# Patient Record
Sex: MALE | Race: BLACK OR AFRICAN AMERICAN | NOT HISPANIC OR LATINO | URBAN - METROPOLITAN AREA
[De-identification: names, ages, dates, MRNs, and addresses within clinical notes are randomized per-mention and may not be internally consistent; named-entity substitution may affect disease eponyms.]

---

## 2023-01-15 ENCOUNTER — EMERGENCY (EMERGENCY)
Facility: HOSPITAL | Age: 43
LOS: 1 days | Discharge: HOME | End: 2023-01-15
Attending: STUDENT IN AN ORGANIZED HEALTH CARE EDUCATION/TRAINING PROGRAM | Admitting: STUDENT IN AN ORGANIZED HEALTH CARE EDUCATION/TRAINING PROGRAM
Payer: COMMERCIAL

## 2023-01-15 VITALS
OXYGEN SATURATION: 100 % | HEART RATE: 53 BPM | DIASTOLIC BLOOD PRESSURE: 81 MMHG | SYSTOLIC BLOOD PRESSURE: 126 MMHG | RESPIRATION RATE: 16 BRPM | TEMPERATURE: 97 F

## 2023-01-15 VITALS
TEMPERATURE: 98 F | RESPIRATION RATE: 18 BRPM | SYSTOLIC BLOOD PRESSURE: 133 MMHG | HEART RATE: 57 BPM | OXYGEN SATURATION: 100 % | DIASTOLIC BLOOD PRESSURE: 83 MMHG

## 2023-01-15 LAB
ALBUMIN SERPL ELPH-MCNC: 4.5 G/DL — SIGNIFICANT CHANGE UP (ref 3.3–5)
ALP SERPL-CCNC: 109 U/L — SIGNIFICANT CHANGE UP (ref 40–120)
ALT FLD-CCNC: 21 U/L — SIGNIFICANT CHANGE UP (ref 4–41)
ANION GAP SERPL CALC-SCNC: 9 MMOL/L — SIGNIFICANT CHANGE UP (ref 7–14)
AST SERPL-CCNC: 29 U/L — SIGNIFICANT CHANGE UP (ref 4–40)
BASOPHILS # BLD AUTO: 0.01 K/UL — SIGNIFICANT CHANGE UP (ref 0–0.2)
BASOPHILS NFR BLD AUTO: 0.2 % — SIGNIFICANT CHANGE UP (ref 0–2)
BILIRUB SERPL-MCNC: 0.6 MG/DL — SIGNIFICANT CHANGE UP (ref 0.2–1.2)
BUN SERPL-MCNC: 16 MG/DL — SIGNIFICANT CHANGE UP (ref 7–23)
CALCIUM SERPL-MCNC: 9.8 MG/DL — SIGNIFICANT CHANGE UP (ref 8.4–10.5)
CHLORIDE SERPL-SCNC: 101 MMOL/L — SIGNIFICANT CHANGE UP (ref 98–107)
CO2 SERPL-SCNC: 27 MMOL/L — SIGNIFICANT CHANGE UP (ref 22–31)
CREAT SERPL-MCNC: 0.92 MG/DL — SIGNIFICANT CHANGE UP (ref 0.5–1.3)
EGFR: 105 ML/MIN/1.73M2 — SIGNIFICANT CHANGE UP
EOSINOPHIL # BLD AUTO: 0.05 K/UL — SIGNIFICANT CHANGE UP (ref 0–0.5)
EOSINOPHIL NFR BLD AUTO: 0.9 % — SIGNIFICANT CHANGE UP (ref 0–6)
GLUCOSE SERPL-MCNC: 100 MG/DL — HIGH (ref 70–99)
HCT VFR BLD CALC: 42 % — SIGNIFICANT CHANGE UP (ref 39–50)
HGB BLD-MCNC: 14.2 G/DL — SIGNIFICANT CHANGE UP (ref 13–17)
IANC: 2.26 K/UL — SIGNIFICANT CHANGE UP (ref 1.8–7.4)
IMM GRANULOCYTES NFR BLD AUTO: 0.4 % — SIGNIFICANT CHANGE UP (ref 0–0.9)
LYMPHOCYTES # BLD AUTO: 2.54 K/UL — SIGNIFICANT CHANGE UP (ref 1–3.3)
LYMPHOCYTES # BLD AUTO: 47.6 % — HIGH (ref 13–44)
MCHC RBC-ENTMCNC: 30.7 PG — SIGNIFICANT CHANGE UP (ref 27–34)
MCHC RBC-ENTMCNC: 33.8 GM/DL — SIGNIFICANT CHANGE UP (ref 32–36)
MCV RBC AUTO: 90.7 FL — SIGNIFICANT CHANGE UP (ref 80–100)
MONOCYTES # BLD AUTO: 0.46 K/UL — SIGNIFICANT CHANGE UP (ref 0–0.9)
MONOCYTES NFR BLD AUTO: 8.6 % — SIGNIFICANT CHANGE UP (ref 2–14)
NEUTROPHILS # BLD AUTO: 2.26 K/UL — SIGNIFICANT CHANGE UP (ref 1.8–7.4)
NEUTROPHILS NFR BLD AUTO: 42.3 % — LOW (ref 43–77)
NRBC # BLD: 0 /100 WBCS — SIGNIFICANT CHANGE UP (ref 0–0)
NRBC # FLD: 0 K/UL — SIGNIFICANT CHANGE UP (ref 0–0)
PLATELET # BLD AUTO: 284 K/UL — SIGNIFICANT CHANGE UP (ref 150–400)
POTASSIUM SERPL-MCNC: 4.5 MMOL/L — SIGNIFICANT CHANGE UP (ref 3.5–5.3)
POTASSIUM SERPL-SCNC: 4.5 MMOL/L — SIGNIFICANT CHANGE UP (ref 3.5–5.3)
PROT SERPL-MCNC: 8.3 G/DL — SIGNIFICANT CHANGE UP (ref 6–8.3)
RBC # BLD: 4.63 M/UL — SIGNIFICANT CHANGE UP (ref 4.2–5.8)
RBC # FLD: 11.9 % — SIGNIFICANT CHANGE UP (ref 10.3–14.5)
SARS-COV-2 RNA SPEC QL NAA+PROBE: SIGNIFICANT CHANGE UP
SODIUM SERPL-SCNC: 137 MMOL/L — SIGNIFICANT CHANGE UP (ref 135–145)
WBC # BLD: 5.34 K/UL — SIGNIFICANT CHANGE UP (ref 3.8–10.5)
WBC # FLD AUTO: 5.34 K/UL — SIGNIFICANT CHANGE UP (ref 3.8–10.5)

## 2023-01-15 PROCEDURE — 99284 EMERGENCY DEPT VISIT MOD MDM: CPT

## 2023-01-15 PROCEDURE — 70491 CT SOFT TISSUE NECK W/DYE: CPT | Mod: 26,MA

## 2023-01-15 RX ORDER — CEPHALEXIN 500 MG
1 CAPSULE ORAL
Qty: 10 | Refills: 0
Start: 2023-01-15 | End: 2023-01-19

## 2023-01-15 RX ORDER — CEPHALEXIN 500 MG
500 CAPSULE ORAL ONCE
Refills: 0 | Status: COMPLETED | OUTPATIENT
Start: 2023-01-15 | End: 2023-01-15

## 2023-01-15 RX ORDER — KETOROLAC TROMETHAMINE 30 MG/ML
15 SYRINGE (ML) INJECTION ONCE
Refills: 0 | Status: DISCONTINUED | OUTPATIENT
Start: 2023-01-15 | End: 2023-01-15

## 2023-01-15 RX ADMIN — Medication 500 MILLIGRAM(S): at 15:25

## 2023-01-15 RX ADMIN — Medication 15 MILLIGRAM(S): at 11:21

## 2023-01-15 RX ADMIN — Medication 15 MILLIGRAM(S): at 11:51

## 2023-01-15 NOTE — ED PROVIDER NOTE - PROGRESS NOTE DETAILS
Etta Sullivan PGY-3 neck abscess drained by dr Whitt bedside. patient tolerated procedure well. small amount of pus drained. wound culture sent  will provide abx and follow up with dr whitt  given strict return precautions for any worsening swelling, pain or any new concerning sxs to come back to er

## 2023-01-15 NOTE — ED PROVIDER NOTE - CARE PROVIDER_API CALL
Christ Sanchez)  Otolaryngology  [address] [place] [address], [place] [address]  Phone: [phone]  Fax: [phone]  Follow Up Time:

## 2023-01-15 NOTE — ED PROVIDER NOTE - CLINICAL SUMMARY MEDICAL DECISION MAKING FREE TEXT BOX
42y M no past medical hx p/w R neck abscess x 4 days  afebrile, exam consistent with neck abscess vs infected cyst. no midline shift, tolerating secretions. low suspicion for airway mass effect. will obtain ct imaging to classify extent of abscess and consult ENT for drainage given location.

## 2023-01-15 NOTE — ED PROVIDER NOTE - PHYSICAL EXAMINATION
Vital signs reviewed  GENERAL: Patient nontoxic appearing, NAD  HEAD: NCAT  EYES: Anicteric  ENT: MMM  NECK:   +R sided neck mass with 4x4cm of fluctuance, surrounding induration. +tenderness. no surrounding erythema. trachea midline  neck rom intact. uvula midline, no tongue elevation.   RESPIRATORY: Normal respiratory effort. CTA B/L. No wheezing, rales, rhonchi  CARDIOVASCULAR: Regular rate and rhythm  ABDOMEN: Soft. Nondistended. Nontender. No guarding or rebound. No CVA tenderness.  MUSCULOSKELETAL/EXTREMITIES: Brisk cap refill. 2+ radial pulses. No leg edema.  SKIN:  Warm and dry  NEURO: AAOx3. No gross FND.  PSYCHIATRIC: Cooperative. Affect appropriate.

## 2023-01-15 NOTE — ED PROVIDER NOTE - OBJECTIVE STATEMENT
42y M no past medical hx p/w R neck abscess x 4 days  pt endorsing swelling pain, redness and drainage from area over past 4 days. went to UC today, told that he has a large abscess and to go to er  otherwise no fever, cp, sob, vomiting, trouble swallowing, sore throat  tolerating PO  no hx of abscess in past.

## 2023-01-15 NOTE — ED ADULT TRIAGE NOTE - CHIEF COMPLAINT QUOTE
[facility]. pt has lump / abscess on neck that started 4 dasy ago/ urgi drained it but not enough

## 2023-01-15 NOTE — ED ADULT NURSE NOTE - OBJECTIVE STATEMENT
Received patient in Intake 4 c/o right sided neck pain w/swelling for few day, patient had I&D done at urgent care, dressing applied. Patient denies fever, SOB, chest pain. Patient is A&OX4, airway patent, breathing unlabored and even, radial pulses palpable. Labs obtained, 20G IV placed on right arm, medication given as ordered, awaiting CT scan. Side rails up and safety maintained. Fall precaution in place.

## 2023-01-15 NOTE — ED ADULT NURSE NOTE - PRO INTERPRETER NEED 2
----- Message from Fredi Ellis sent at 2/16/2022 10:50 AM CST -----  Regarding: Appt Reschedule  Name of Who is Calling: SHRUTHI JARAMILLO [[id number]]           What is the request in detail: Patient is requesting a call back to reschedule her upcoming visit to 02/22/22.  Please assist.           Can the clinic reply by [facility]: No           What [facility]: [phone]     English

## 2023-01-15 NOTE — ED ADULT TRIAGE NOTE - PATIENT ON (OXYGEN DELIVERY METHOD)
room air O-T Advancement Flap Text: The defect edges were debeveled with a #15 scalpel blade.  Given the location of the defect, shape of the defect and the proximity to free margins an O-T advancement flap was deemed most appropriate.  Using a sterile surgical marker, an appropriate advancement flap was drawn incorporating the defect and placing the expected incisions within the relaxed skin tension lines where possible.    The area thus outlined was incised deep to adipose tissue with a #15 scalpel blade.  The skin margins were undermined to an appropriate distance in all directions utilizing iris scissors.

## 2023-01-15 NOTE — ED PROVIDER NOTE - NSFOLLOWUPINSTRUCTIONS_ED_ALL_ED_FT
Skin Abscess    Close-up of an abscess on the skin.   A skin abscess is an infected area on or under your skin that contains a collection of pus and other material. An abscess may also be called a furuncle, carbuncle, or boil. An abscess can occur in or on almost any part of your body.    Some abscesses break open (rupture) on their own. Most continue to get worse unless they are treated. The infection can spread deeper into the body and eventually into your blood, which can make you feel ill. Treatment usually involves draining the abscess.      What are the causes?    An abscess occurs when germs, like bacteria, pass through your skin and cause an infection. This may be caused by:  •A scrape or cut on your skin.      •A puncture wound through your skin, including a needle injection or insect bite.      •Blocked oil or sweat glands.      •Blocked and infected hair follicles.      •A cyst that forms beneath your skin (sebaceous cyst) and becomes infected.        What increases the risk?    This condition is more likely to develop in people who:  •Have a weak body defense system (immune system).      •Have diabetes.      •Have dry and irritated skin.      •Get frequent injections or use illegal IV drugs.      •Have a foreign body in a wound, such as a splinter.      •Have problems with their lymph system or veins.        What are the signs or symptoms?    Symptoms of this condition include:  •A painful, firm bump under the skin.      •A bump with pus at the top. This may break through the skin and drain.      Other symptoms include:  •Redness surrounding the abscess site.      •Warmth.      •Swelling of the lymph nodes (glands) near the abscess.      •Tenderness.      •A sore on the skin.        How is this diagnosed?    This condition may be diagnosed based on:  •A physical exam.      •Your medical history.      •A sample of pus. This may be used to find out what is causing the infection.      •Blood tests.      •Imaging tests, such as an ultrasound, CT scan, or MRI.        How is this treated?    A small abscess that drains on its own may not need treatment. Treatment for larger abscesses may include:  •Moist heat or heat pack applied to the area several times a day.      •A procedure to drain the abscess (incision and drainage).      •Antibiotic medicines. For a severe abscess, you may first get antibiotics through an IV and then change to antibiotics by mouth.        Follow these instructions at home:      Medicines   A prescription pill bottle with an example of a pill.   •Take over-the-counter and prescription medicines only as told by your health care provider.      •If you were prescribed an antibiotic medicine, take it as told by your health care provider. Do not stop taking the antibiotic even if you start to feel better.        Abscess care   Washing hands with soap and water. •If you have an abscess that has not drained, apply heat to the affected area. Use the heat source that your health care provider recommends, such as a moist heat pack or a heating pad.  •Place a towel between your skin and the heat source.      •Leave the heat on for 20–30 minutes.      •Remove the heat if your skin turns bright red. This is especially important if you are unable to feel pain, heat, or cold. You may have a greater risk of getting burned.      •Follow instructions from your health care provider about how to take care of your abscess. Make sure you:  •Cover the abscess with a bandage (dressing).      •Change your dressing or gauze as told by your health care provider.      •Wash your hands with soap and water before you change the dressing or gauze. If soap and water are not available, use hand .      •Check your abscess every day for signs of a worsening infection. Check for:  •More redness, swelling, or pain.      •More fluid or blood.      •Warmth.      •More pus or a bad smell.        General instructions   •To avoid spreading the infection:  •Do not share personal care items, towels, or hot tubs with others.      •Avoid making skin contact with other people.        •Keep all follow-up visits as told by your health care provider. This is important.        Contact a health care provider if you have:    •More redness, swelling, or pain around your abscess.      •More fluid or blood coming from your abscess.      •Warm skin around your abscess.      •More pus or a bad smell coming from your abscess.      •Muscle aches.      •Chills or a general ill feeling.        Get help right away if you:    •Have severe pain.      •See red streaks on your skin spreading away from the abscess.      •See redness that spreads quickly.      •Have a fever or chills.        Summary    •A skin abscess is an infected area on or under your skin that contains a collection of pus and other material.      •A small abscess that drains on its own may not need treatment.      •Treatment for larger abscesses may include having a procedure to drain the abscess and taking an antibiotic.      This information is not intended to replace advice given to you by your health care provider. Make sure you discuss any questions you have with your health care provider.

## 2023-01-15 NOTE — ED PROVIDER NOTE - PATIENT PORTAL LINK FT
You can access the FollowMyHealth Patient Portal offered by [facility] by registering at the following website: [email or web address]. By joining [facility]’s FollowMyHealth portal, you will also be able to view your health information using other applications (apps) compatible with our system.

## 2023-01-15 NOTE — ED PROVIDER NOTE - ATTENDING CONTRIBUTION TO CARE
42-year-old male with no past medical history presented with right-sided neck swelling. Patient states he has been having swelling on the right side of his neck which has been worsening over the last few days. States he has pain with palpation to the area. Patient reports going to urgent care and having I&D done on the neck with pus and some mild blood. Patient states that they told him to come to the ED because they weren't able to fully drain the likely abscess. Patient denies fever chills nausea vomiting chest pain. He denies prior similar episodes of abscess on his neck. Last tetanus shot was today at the urgent care  denies fever, chills, chest pain, SOB, abdominal pain, diarrhea, dysuria, syncope, bleeding, +new rash,weakness, numbness, blurred vision    ROS  otherwise negative as per HPI  Gen: Awake, Alert, WD, WN, NAD  Head:  NC/AT  Eyes:  PERRL, EOMI, Conjunctiva pink, lids normal, no scleral icterus  ENT: OP clear, y, moist mucus membranes  Neck: supple, swelling on right anterior neck w/ 3cm by 3cm area of induration  no meningismus, no JVD, trachea midline   Cardiac/CV:  S1 S2, RRR, no M/G/R  Respiratory/Pulm:  CTAB, good air movement, normal resp effort, no wheezes/stridor/retractions/rales/rhonchi  Gastrointestinal/Abdomen:  Soft, nontender, nondistended, +BS, no rebound/guarding  Back:  no CVAT, no MLT  Ext:  warm, well perfused, moving all extremities spontaneously, no peripheral edema, distal pulses intact  Skin: induration and mild drainage form right neck abscess 3cm by 3cm   Neuro:  AAOx3, sensation intact, motor 5/5 x 4 extremities, normal gait, speech clear

## 2023-01-15 NOTE — ED ADULT NURSE NOTE - CHIEF COMPLAINT QUOTE
[facility]. pt has lump / abscess on neck that started 4 dasy ago/ urgi drained it but not enough

## 2023-01-18 NOTE — ED POST DISCHARGE NOTE - ADDITIONAL DOCUMENTATION
MICHELLE Hummel: on 1/22/2023 lab called about wound culture + for MRSA. according to previous note, pt was not reached. called pt again at [phone]; told pt about MRSA, and need to change his meds into bacrtrim; confirmed pt has NKDA. pt states wound seems to be more swollen but denies fever; advised pt to return if having fever and wound continue to grow despite new antibiotics.

## 2023-01-18 NOTE — ED POST DISCHARGE NOTE - RESULT SUMMARY
Lab Called [facility]: MRSA Patient discharged home with a prescription for Keflex which is resistant. Patient contact # [phone] message left with Call Back  P.A. number and hours for return call back. Alt # [phone] " mailbox full . Try again later" Call Back  PA to continue to try and contact patient.

## 2023-01-22 RX ORDER — IBUPROFEN 200 MG
1 TABLET ORAL
Qty: 15 | Refills: 0
Start: 2023-01-22 | End: 2023-01-26

## 2023-02-27 NOTE — CHART NOTE - NSCHARTNOTEFT_GEN_A_CORE
[facility]  Head & Neck Surgery Procedure Note      Name:                  Gautam Zimmerman	                Surgeon:  Christ Sanchez MD  					  Date of Procedure: 01/15/2023                         Assistant:  None    Record Number:	 [id number]                              Anesthesia:  Local Anesthesia      Preoperative diagnosis:	Abscess of Salivary Gland (K11.3)  	  Postoperative diagnosis:	Same    Procedure:	1.)	Incision & Drainage Abscess, Submandibular, External approach (19956)                            	  INDICATION:  The patient is a 42y M no past medical hx p/w R neck abscess x 4 days.  Pt endorsing swelling pain, redness and drainage from area over past 4 days. went to  today, told that he has a large abscess and to go to er      PROCEDURE: The patient was seen and consent was obtained from the patient for the procedure to be performed.  Next his skin under his mandible on the right side was injected with approximately 10 cc of 1% lidocaine with 1/100K parts epinephrine.  Betadyne prep was used to sterilize the skin of the neck on the right side. Next the patient was draped in the usual and standard fashion. Next attention was turned to the right side of the floor of mouth. Next, using a #15 blade, an incision was carried out through skin, subcutaneous tissue and platysma muscle on the right side of the neck, 2 finger breadths below the mandible.  The incision was carried down through the platysma deep to the posterior belly of the digastric muscle. Next, using dissecting hemostats, the dissection was carried posteriorly into the submandibular space. A pus pocket was entered and copious amounts of purulence was removed.  The cavity was then irrigated with approximately 10cc of cold normal saline.  Hemostasis was achieved using chemical cautery.  A 1/4 inch nugauze drain was then placed within the wound and evacuated abscess pocket.  Finally, the wound was then covered using sterile gauze. The patient tolerated the procedure well with no complications.

## 2024-11-24 ENCOUNTER — EMERGENCY (EMERGENCY)
Facility: HOSPITAL | Age: 44
LOS: 1 days | Discharge: HOME | End: 2024-11-24
Attending: EMERGENCY MEDICINE | Admitting: EMERGENCY MEDICINE
Payer: COMMERCIAL

## 2024-11-24 VITALS
OXYGEN SATURATION: 97 % | RESPIRATION RATE: 17 BRPM | WEIGHT: 169.98 LBS | HEIGHT: 64 IN | SYSTOLIC BLOOD PRESSURE: 125 MMHG | DIASTOLIC BLOOD PRESSURE: 80 MMHG | TEMPERATURE: 99 F | HEART RATE: 84 BPM

## 2024-11-24 VITALS
SYSTOLIC BLOOD PRESSURE: 106 MMHG | HEART RATE: 72 BPM | DIASTOLIC BLOOD PRESSURE: 68 MMHG | TEMPERATURE: 99 F | OXYGEN SATURATION: 100 % | RESPIRATION RATE: 17 BRPM

## 2024-11-24 LAB
ALBUMIN SERPL ELPH-MCNC: 4.4 G/DL — SIGNIFICANT CHANGE UP (ref 3.3–5)
ALP SERPL-CCNC: 106 U/L — SIGNIFICANT CHANGE UP (ref 40–120)
ALT FLD-CCNC: 20 U/L — SIGNIFICANT CHANGE UP (ref 4–41)
ANION GAP SERPL CALC-SCNC: 10 MMOL/L — SIGNIFICANT CHANGE UP (ref 7–14)
APPEARANCE UR: CLEAR — SIGNIFICANT CHANGE UP
AST SERPL-CCNC: 24 U/L — SIGNIFICANT CHANGE UP (ref 4–40)
BASOPHILS # BLD AUTO: 0.01 K/UL — SIGNIFICANT CHANGE UP (ref 0–0.2)
BASOPHILS NFR BLD AUTO: 0.2 % — SIGNIFICANT CHANGE UP (ref 0–2)
BILIRUB SERPL-MCNC: 0.8 MG/DL — SIGNIFICANT CHANGE UP (ref 0.2–1.2)
BILIRUB UR-MCNC: NEGATIVE — SIGNIFICANT CHANGE UP
BUN SERPL-MCNC: 10 MG/DL — SIGNIFICANT CHANGE UP (ref 7–23)
CALCIUM SERPL-MCNC: 9.5 MG/DL — SIGNIFICANT CHANGE UP (ref 8.4–10.5)
CHLORIDE SERPL-SCNC: 102 MMOL/L — SIGNIFICANT CHANGE UP (ref 98–107)
CO2 SERPL-SCNC: 28 MMOL/L — SIGNIFICANT CHANGE UP (ref 22–31)
COLOR SPEC: YELLOW — SIGNIFICANT CHANGE UP
CREAT SERPL-MCNC: 1.07 MG/DL — SIGNIFICANT CHANGE UP (ref 0.5–1.3)
DIFF PNL FLD: NEGATIVE — SIGNIFICANT CHANGE UP
EGFR: 87 ML/MIN/1.73M2 — SIGNIFICANT CHANGE UP
EOSINOPHIL # BLD AUTO: 0.03 K/UL — SIGNIFICANT CHANGE UP (ref 0–0.5)
EOSINOPHIL NFR BLD AUTO: 0.6 % — SIGNIFICANT CHANGE UP (ref 0–6)
FLUAV AG NPH QL: SIGNIFICANT CHANGE UP
FLUBV AG NPH QL: SIGNIFICANT CHANGE UP
GLUCOSE SERPL-MCNC: 87 MG/DL — SIGNIFICANT CHANGE UP (ref 70–99)
GLUCOSE UR QL: NEGATIVE MG/DL — SIGNIFICANT CHANGE UP
HCT VFR BLD CALC: 42.5 % — SIGNIFICANT CHANGE UP (ref 39–50)
HETEROPH AB TITR SER AGGL: NEGATIVE — SIGNIFICANT CHANGE UP
HGB BLD-MCNC: 14.9 G/DL — SIGNIFICANT CHANGE UP (ref 13–17)
HIV 1+2 AB+HIV1 P24 AG SERPL QL IA: SIGNIFICANT CHANGE UP
IANC: 4.14 K/UL — SIGNIFICANT CHANGE UP (ref 1.8–7.4)
IMM GRANULOCYTES NFR BLD AUTO: 0.4 % — SIGNIFICANT CHANGE UP (ref 0–0.9)
KETONES UR-MCNC: ABNORMAL MG/DL
LEUKOCYTE ESTERASE UR-ACNC: NEGATIVE — SIGNIFICANT CHANGE UP
LIDOCAIN IGE QN: 30 U/L — SIGNIFICANT CHANGE UP (ref 7–60)
LYMPHOCYTES # BLD AUTO: 0.59 K/UL — LOW (ref 1–3.3)
LYMPHOCYTES # BLD AUTO: 11.3 % — LOW (ref 13–44)
MCHC RBC-ENTMCNC: 31.1 PG — SIGNIFICANT CHANGE UP (ref 27–34)
MCHC RBC-ENTMCNC: 35.1 G/DL — SIGNIFICANT CHANGE UP (ref 32–36)
MCV RBC AUTO: 88.7 FL — SIGNIFICANT CHANGE UP (ref 80–100)
MONOCYTES # BLD AUTO: 0.45 K/UL — SIGNIFICANT CHANGE UP (ref 0–0.9)
MONOCYTES NFR BLD AUTO: 8.6 % — SIGNIFICANT CHANGE UP (ref 2–14)
NEUTROPHILS # BLD AUTO: 4.14 K/UL — SIGNIFICANT CHANGE UP (ref 1.8–7.4)
NEUTROPHILS NFR BLD AUTO: 78.9 % — HIGH (ref 43–77)
NITRITE UR-MCNC: NEGATIVE — SIGNIFICANT CHANGE UP
NRBC # BLD: 0 /100 WBCS — SIGNIFICANT CHANGE UP (ref 0–0)
NRBC # FLD: 0 K/UL — SIGNIFICANT CHANGE UP (ref 0–0)
PH UR: 5.5 — SIGNIFICANT CHANGE UP (ref 5–8)
PLATELET # BLD AUTO: 210 K/UL — SIGNIFICANT CHANGE UP (ref 150–400)
POTASSIUM SERPL-MCNC: 4.6 MMOL/L — SIGNIFICANT CHANGE UP (ref 3.5–5.3)
POTASSIUM SERPL-SCNC: 4.6 MMOL/L — SIGNIFICANT CHANGE UP (ref 3.5–5.3)
PROT SERPL-MCNC: 7.5 G/DL — SIGNIFICANT CHANGE UP (ref 6–8.3)
PROT UR-MCNC: NEGATIVE MG/DL — SIGNIFICANT CHANGE UP
RBC # BLD: 4.79 M/UL — SIGNIFICANT CHANGE UP (ref 4.2–5.8)
RBC # FLD: 11.9 % — SIGNIFICANT CHANGE UP (ref 10.3–14.5)
RSV RNA NPH QL NAA+NON-PROBE: SIGNIFICANT CHANGE UP
SARS-COV-2 RNA SPEC QL NAA+PROBE: SIGNIFICANT CHANGE UP
SODIUM SERPL-SCNC: 140 MMOL/L — SIGNIFICANT CHANGE UP (ref 135–145)
SP GR SPEC: 1.02 — SIGNIFICANT CHANGE UP (ref 1–1.03)
TSH SERPL-MCNC: 0.38 UIU/ML — SIGNIFICANT CHANGE UP (ref 0.27–4.2)
UROBILINOGEN FLD QL: 0.2 MG/DL — SIGNIFICANT CHANGE UP (ref 0.2–1)
WBC # BLD: 5.24 K/UL — SIGNIFICANT CHANGE UP (ref 3.8–10.5)
WBC # FLD AUTO: 5.24 K/UL — SIGNIFICANT CHANGE UP (ref 3.8–10.5)

## 2024-11-24 PROCEDURE — 99284 EMERGENCY DEPT VISIT MOD MDM: CPT

## 2024-11-24 PROCEDURE — 71046 X-RAY EXAM CHEST 2 VIEWS: CPT | Mod: 26

## 2024-11-24 RX ORDER — KETOROLAC TROMETHAMINE 30 MG/ML
15 INJECTION INTRAMUSCULAR; INTRAVENOUS ONCE
Refills: 0 | Status: DISCONTINUED | OUTPATIENT
Start: 2024-11-24 | End: 2024-11-24

## 2024-11-24 RX ORDER — SODIUM CHLORIDE 9 MG/ML
1000 INJECTION, SOLUTION INTRAMUSCULAR; INTRAVENOUS; SUBCUTANEOUS ONCE
Refills: 0 | Status: COMPLETED | OUTPATIENT
Start: 2024-11-24 | End: 2024-11-24

## 2024-11-24 RX ADMIN — KETOROLAC TROMETHAMINE 15 MILLIGRAM(S): 30 INJECTION INTRAMUSCULAR; INTRAVENOUS at 06:59

## 2024-11-24 RX ADMIN — SODIUM CHLORIDE 1000 MILLILITER(S): 9 INJECTION, SOLUTION INTRAMUSCULAR; INTRAVENOUS; SUBCUTANEOUS at 06:58

## 2024-11-24 NOTE — ED PROVIDER NOTE - CLINICAL SUMMARY MEDICAL DECISION MAKING FREE TEXT BOX
44-year-old male with no significant past medical history presents emergency department for evaluation of multiple symptoms over the past 3 weeks.  Is currently endorsing myalgias, chills and subjective fevers, sore throat, decreased p.o. intake epigastric abdominal pain. Given prolonged symptoms, suspect viral illness versus mononucleosis versus anemia or electrolyte imbalance as cause of ongoing fatigue.  Will check labs, including HIV and Monospot, obtain chest x-ray, UA, give fluids and medicate for pain.  Will reassess once lab work back, anticipate DC home with PCP follow-up.

## 2024-11-24 NOTE — ED PROVIDER NOTE - NSCAREINITIATED _GEN_ER
Last seen: 7/15/20  Follow up appointment: none  Last filled: Indomethacin 5/26/20         Scripts eprescribed to pharmacy per MD       Fiorella Fernandez(Resident)

## 2024-11-24 NOTE — ED ADULT TRIAGE NOTE - CHIEF COMPLAINT QUOTE
Pt arrives to ED c/o nausea, body aches, chills and cough with phlegm.  Pt completed antibiotic (does not recall name) from his PCP with no relief.  Pt denies medical history.

## 2024-11-24 NOTE — ED ADULT NURSE NOTE - NSFALLUNIVINTERV_ED_ALL_ED
Bed/Stretcher in lowest position, wheels locked, appropriate side rails in place/Call bell, personal items and telephone in reach/Instruct patient to call for assistance before getting out of bed/chair/stretcher/Non-slip footwear applied when patient is off stretcher/[place] to call system/Physically safe environment - no spills, clutter or unnecessary equipment/Purposeful proactive rounding/Room/bathroom lighting operational, light cord in reach

## 2024-11-24 NOTE — ED PROVIDER NOTE - ATTENDING CONTRIBUTION TO CARE
DR. MEDINA, ATTENDING MD-  I performed a face to face bedside interview with the patient regarding history of present illness, review of symptoms and past medical history. I completed an independent physical exam.  I have discussed the patient's plan of care with the resident.   Documentation as above in the note.    43 y/o male with c/o flu-like illness x3 wks.  Sick contacts at home and at workplace.  Eval for pna, likely viral syndrome.  Pt non toxic appearing, vss.  Obtain cbc cmp viral swab cxr give pain med ivf bolus reassess likely dc with pcp f/u.

## 2024-11-24 NOTE — ED PROVIDER NOTE - PHYSICAL EXAMINATION
Gen: appears generally weak, but non toxic appearing   Head: normal appearing  HEENT: normal conjunctiva, oral mucosa moist, vision grossly intact, normal posterior oropharynx, uvula midline   Lung: no respiratory distress, speaking in full sentences, CTA b/l, no wheeze, crackles or rhonchi   CV: regular rate and rhythm, no murmurs  Abd: soft, non distended, mild epigastric and suprapubic tenderness   MSK: no visible deformities, ambulating without difficulty   Neuro: No focal deficits, AAOx3  Skin: Warm, no rashes   Psych: normal affect

## 2024-11-24 NOTE — ED PROVIDER NOTE - PATIENT PORTAL LINK FT
You can access the FollowMyHealth Patient Portal offered by [facility] by registering at the following website: [email or web address]. By joining [facility]’s FollowMyHealth portal, you will also be able to view your health information using other applications (apps) compatible with our system.

## 2024-11-24 NOTE — ED PROVIDER NOTE - PROGRESS NOTE DETAILS
Manjinder DRUMMOND), PGY-2: received pt as a signout, informed of plan to follow up labs and UA and CXR, all ED work up unremarkable and nonactionable, pt reeassessed at bedside stating he is feeling better, results discussed with pt, pt stable for dc home with return precautions, encouraged to follow up with PCP next week.

## 2024-11-24 NOTE — ED PROVIDER NOTE - OBJECTIVE STATEMENT
44-year-old male with no significant past medical history presents emergency department for evaluation of multiple symptoms over the past 3 weeks.  Is currently endorsing myalgias, chills and subjective fevers, sore throat, decreased p.o. intake epigastric abdominal pain.  In the middle of the symptoms, was evaluated by primary care doctor who prescribed an antibiotic, patient is unsure when antibiotic but took the course without improvement in his symptoms.  Works as a , no recent international travel over the past 6 months.  Denies measured fevers, chest pain, productive cough or hemoptysis, vomiting or diarrhea, pain with urination.  No known drug allergies.  Has not taken any over-the-counter medications for symptoms.

## 2024-11-24 NOTE — ED PROVIDER NOTE - NSFOLLOWUPINSTRUCTIONS_ED_ALL_ED_FT
Right temple cranioplasty defect PLEASE SEE ALL RESULTS BELOW.  Please review these results with your primary care physician to establish any follow ups as required.  Please follow up with your primary care physician within 1-3 days for continued care and evaluation.    You can take over the counter Tylenol up to 1000mg every 6 hours as needed for pain and/or over the counter Motrin up to 600mg every 6 hours as needed for pain, please follow the instructions on the bottle.    Please RETURN TO THE EMERGENCY DEPARTMENT OR SEEK IMMEDIATE MEDICAL ATTENTION if you experience any new or worsening symptoms, including but not limited to: fevers, chills, persistent nausea or vomiting or diarrhea, significant fatigue, significantly decreased physical activity, inability to stand or walk on your own, inability to hold down foods or fluids because of nausea or vomiting, fainting, severe headaches, vision changes, chest pain, shortness of breath, bloody urine, coughing up or throwing up blood, bloody stools, incontinence of urine or stool.

## 2024-11-24 NOTE — ED ADULT NURSE NOTE - OBJECTIVE STATEMENT
Pt received to room 15, A&Ox4, ambulatory. Pt presenting to the ED complaining of body aches, cough with phlegm, nausea, and chills. Pt  states he saw his PCP and completed the prescribed antibiotic  with no relief.  Pt denies c/p, SOB, headache, blurry vision, vomiting, or diarrhea. Respirations even and unlabored. NAD noted. 20G IV placed in the right AC. Labs drawn and sent. Pt medicated as per MD orders. Comfort measures provided, safety maintained. Plan of care ongoing.

## 2024-11-24 NOTE — ED ADULT NURSE REASSESSMENT NOTE - NS ED NURSE REASSESS COMMENT FT1
Patient is A&O x 4, received from night shift nurse. denies any chest pain, dizziness, SOB at this time. Lung sounds are clear and breathing is even and unlabored. Reports diarrhea with no blood in the stool, bowel sounds are hyperactive reporting cramping and discomfort. was able to ambulate to the bathroom with no assistance or dizziness. plan of care ongoing
Pt to be discharged home. Breathing even and unlabored. No pallor or diaphoresis noted at this time. Pt resting comfortably in stretcher. IV pulled out. Awaiting MD for discharge paperwork. Denies chest pain, headache, dizziness.

## 2025-01-25 NOTE — ED PROVIDER NOTE - DIFFERENTIAL DIAGNOSIS
Emergency Department Provider Note        History of Present Illness     History provided by: Patient  Limitations to History: None  External Records Reviewed with Brief Summary: None    HPI:  Saleem Dawn is a 26 y.o. male with no reported past medical history presents to the ED for 1 week of left-sided chest wall pain.  Worse with deep breathing and movement.  She was seen at an urgent care several days ago for this and was also diagnosed with a ear infection at that time.  Has been taking amoxicillin.  He was instructed to go to the ED at that time but waited several days to see if his symptoms improved.  Also reports issues with constipation.  Complains of nausea.  No abdominal pain otherwise.  Has a history of kidney stones but states this feels different.  No fevers.    Physical Exam   Triage vitals:  T 36.7 °C (98.1 °F)  HR 70  /79  RR 16  O2 100 %      General: Awake, alert, in no acute distress  Eyes: Gaze conjugate.  No scleral icterus or injection  HENT: Normo-cephalic, atraumatic. No stridor  CV: Regular rate, regular rhythm. Radial pulses 2+ bilaterally  Resp: Breathing non-labored, speaking in full sentences.  Clear to auscultation bilaterally  GI: Soft, non-distended, non-tender. LUQ tenderness  : Deferred  MSK/Extremities: No gross bony deformities. Moving all extremities  Skin: Warm. Appropriate color  Neuro: Alert. Oriented. Face symmetric. Speech is fluent.  Gross strength and sensation intact in b/l UE and LEs  Psych: Appropriate mood and affect    Medical Decision Making & ED Course   Medical Decision Makin y.o. male presents to the ED for shortness of breath and abdominal pain.  Upon arrival to the ED in no distress.  Medical workup obtained.  Very low suspicion for acute coronary syndrome or PE.  Given his location of pain in his abdomen I added on a mono which was positive.  Likely has some splenomegaly without rupture.  Was given precautions for mono.  Will recommend  primary care follow-up.  ----      Differential diagnoses considered include but are not limited to: N/A     Social Determinants of Health which Significantly Impact Care: None identified     EKG Independent Interpretation:  Sinus rhythm rate 62.  No ST segment elevations or depressions.  No T wave abnormalities.  Normal intervals normal axis no prior EKG to compare.    Independent Result Review and Interpretation: Relevant laboratory and radiographic results were reviewed and independently interpreted by myself.  As necessary, they are commented on in the ED Course.    Chronic conditions affecting the patient's care: As documented above in [facility]    The patient was discussed with the following consultants/services: None    Care Considerations: As documented above in [facility]    ED Course:  Diagnoses as of 01/26/25 1323   Infectious mononucleosis without complication, infectious mononucleosis due to unspecified organism     Disposition   As a result of the work-up, the patient was discharged home.  he was informed of his diagnosis and instructed to come back with any concerns or worsening of condition.  he and was agreeable to the plan as discussed above.  he was given the opportunity to ask questions.  All of the patient's questions were answered.    Procedures   Procedures    Patient was seen independently    Marc Hummel MD  Emergency Medicine     Marc Hummel MD  01/26/25 1324     abscess vs branchial cleft cyst vs cellulitis Differential Diagnosis